# Patient Record
Sex: FEMALE | Race: BLACK OR AFRICAN AMERICAN | NOT HISPANIC OR LATINO | Employment: OTHER | URBAN - METROPOLITAN AREA
[De-identification: names, ages, dates, MRNs, and addresses within clinical notes are randomized per-mention and may not be internally consistent; named-entity substitution may affect disease eponyms.]

---

## 2017-09-12 ENCOUNTER — OFFICE VISIT (OUTPATIENT)
Dept: INTERNAL MEDICINE | Facility: CLINIC | Age: 57
End: 2017-09-12
Payer: MEDICARE

## 2017-09-12 VITALS
SYSTOLIC BLOOD PRESSURE: 148 MMHG | WEIGHT: 172.19 LBS | RESPIRATION RATE: 20 BRPM | HEIGHT: 63 IN | DIASTOLIC BLOOD PRESSURE: 76 MMHG | HEART RATE: 84 BPM | BODY MASS INDEX: 30.51 KG/M2 | OXYGEN SATURATION: 97 %

## 2017-09-12 DIAGNOSIS — Z12.11 ENCOUNTER FOR COLORECTAL CANCER SCREENING: ICD-10-CM

## 2017-09-12 DIAGNOSIS — R94.128 ABNORMAL HEARING TEST: ICD-10-CM

## 2017-09-12 DIAGNOSIS — F33.1 MDD (MAJOR DEPRESSIVE DISORDER), RECURRENT EPISODE, MODERATE: ICD-10-CM

## 2017-09-12 DIAGNOSIS — M54.2 CHRONIC NECK AND BACK PAIN: ICD-10-CM

## 2017-09-12 DIAGNOSIS — Z12.12 ENCOUNTER FOR COLORECTAL CANCER SCREENING: ICD-10-CM

## 2017-09-12 DIAGNOSIS — E66.9 OBESITY, CLASS I, BMI 30-34.9: ICD-10-CM

## 2017-09-12 DIAGNOSIS — Z11.59 NEED FOR HEPATITIS C SCREENING TEST: ICD-10-CM

## 2017-09-12 DIAGNOSIS — M54.9 CHRONIC NECK AND BACK PAIN: ICD-10-CM

## 2017-09-12 DIAGNOSIS — Z00.00 ENCOUNTER FOR PREVENTIVE HEALTH EXAMINATION: Primary | ICD-10-CM

## 2017-09-12 DIAGNOSIS — F19.21 HISTORY OF DRUG DEPENDENCE/ABUSE: ICD-10-CM

## 2017-09-12 DIAGNOSIS — I10 ESSENTIAL HYPERTENSION: ICD-10-CM

## 2017-09-12 DIAGNOSIS — Z01.118 ABNORMAL HEARING TEST: ICD-10-CM

## 2017-09-12 DIAGNOSIS — G89.29 CHRONIC NECK AND BACK PAIN: ICD-10-CM

## 2017-09-12 PROBLEM — F32.1 MDD (MAJOR DEPRESSIVE DISORDER), SINGLE EPISODE, MODERATE: Status: ACTIVE | Noted: 2017-09-12

## 2017-09-12 PROCEDURE — G0439 PPPS, SUBSEQ VISIT: HCPCS | Mod: S$GLB,,, | Performed by: NURSE PRACTITIONER

## 2017-09-12 PROCEDURE — 99499 UNLISTED E&M SERVICE: CPT | Mod: S$GLB,,, | Performed by: NURSE PRACTITIONER

## 2017-09-12 PROCEDURE — 99999 PR PBB SHADOW E&M-EST. PATIENT-LVL V: CPT | Mod: PBBFAC,,, | Performed by: NURSE PRACTITIONER

## 2017-09-12 RX ORDER — IBUPROFEN 800 MG/1
800 TABLET ORAL 3 TIMES DAILY PRN
COMMUNITY
Start: 2017-09-11

## 2017-09-12 RX ORDER — AMOXICILLIN 500 MG/1
1000 CAPSULE ORAL EVERY 8 HOURS
COMMUNITY
Start: 2017-09-11 | End: 2017-12-13

## 2017-09-12 RX ORDER — SERTRALINE HYDROCHLORIDE 50 MG/1
50 TABLET, FILM COATED ORAL DAILY
COMMUNITY
Start: 2017-09-05

## 2017-09-12 RX ORDER — VALSARTAN AND HYDROCHLOROTHIAZIDE 320; 12.5 MG/1; MG/1
1 TABLET, FILM COATED ORAL DAILY
COMMUNITY

## 2017-09-12 RX ORDER — CYCLOBENZAPRINE HCL 10 MG
10 TABLET ORAL 3 TIMES DAILY PRN
COMMUNITY
Start: 2017-07-12 | End: 2018-05-09

## 2017-09-12 RX ORDER — BUDESONIDE AND FORMOTEROL FUMARATE DIHYDRATE 160; 4.5 UG/1; UG/1
2 AEROSOL RESPIRATORY (INHALATION) EVERY 12 HOURS
COMMUNITY

## 2017-09-12 RX ORDER — MELOXICAM 15 MG/1
15 TABLET ORAL 2 TIMES DAILY PRN
COMMUNITY
Start: 2017-07-04

## 2017-09-12 RX ORDER — TRIAMCINOLONE ACETONIDE 5 MG/G
CREAM TOPICAL 2 TIMES DAILY PRN
COMMUNITY
Start: 2017-07-12

## 2017-09-12 RX ORDER — BUDESONIDE AND FORMOTEROL FUMARATE DIHYDRATE 160; 4.5 UG/1; UG/1
AEROSOL RESPIRATORY (INHALATION)
COMMUNITY
Start: 2015-07-14 | End: 2017-09-12 | Stop reason: SDUPTHER

## 2017-09-12 RX ORDER — ALBUTEROL SULFATE 1.25 MG/3ML
1.25 SOLUTION RESPIRATORY (INHALATION) EVERY 6 HOURS PRN
COMMUNITY
End: 2017-09-12 | Stop reason: SDUPTHER

## 2017-09-12 RX ORDER — ALBUTEROL SULFATE 90 UG/1
1-2 AEROSOL, METERED RESPIRATORY (INHALATION) EVERY 6 HOURS PRN
COMMUNITY
Start: 2015-04-10

## 2017-09-12 NOTE — PATIENT INSTRUCTIONS
Please call [phone] to schedule an appointment with our [facility] doctor (Dr. Vásquez) to evaluate your hearing.          Counseling and Referral of Other Preventative  (Italic type indicates deductible and co-insurance are waived)    Patient Name: Alecia Martinez  Today's Date: 9/12/2017      SERVICE LIMITATIONS RECOMMENDATION    Vaccines    · Pneumococcal (once after 65)    · Influenza (annually)    · Hepatitis B (if medium/high risk)    · Prevnar 13      Hepatitis B medium/high risk factors:       - End-stage renal disease       - Hemophiliacs who received Factor VII or         IX concentrates       - Clients of institutions for the mentally             retarded       - Persons who live in the same house as          a HepB carrier       - Homosexual men       - Illicit injectable drug abusers     Pneumococcal: N/A  She stated she would obtain at retail pharmacy.     Influenza: N/A  She stated she would obtain at retail pharmacy.     Hepatitis B: N/A     Prevnar 13: N/A Not indicated until after 64 yo    Mammogram (biennial age 50-74)  Annually (age 40 or over)  Scheduled, see appointments.  She stated she has a mammogram scheduled next week at [facility].    Pap (up to age 70 and after 70 if unknown history or abnormal study last 10 years)    Last done 8/2016, recommend to repeat every 3  years or as recommended by Gynecology.     The USPSTF recommends screening for cervical cancer in women age 21 to 65 years with cytology (Pap smear) every 3 years.     Colorectal cancer screening (to age 75)    · Fecal occult blood test (annual)  · Flexible sigmoidoscopy (5y)  · Screening colonoscopy (10y)  · Barium enema   Scheduled, see appointments.  Ordered.      Diabetes self-management training (no USPSTF recommendations)  Requires referral by treating physician for patient with diabetes or renal disease. 10 hours of initial DSMT sessions of no less than 30 minutes each in a continuous 12-month  period. 2 hours of follow-up DSMT in subsequent years.  N/A    Bone mass measurements (age 65 & older, biennial)  Requires diagnosis related to osteoporosis or estrogen deficiency. Biennial benefit unless patient has history of long-term glucocorticoid  N/A    Glaucoma screening (no USPSTF recommendation)  Diabetes mellitus, family history   , age 50 or over    American, age 65 or over  Scheduled, see appointments    Medical nutrition therapy for diabetes or renal disease (no recommended schedule)  Requires referral by treating physician for patient with diabetes or renal disease or kidney transplant within the past 3 years.  Can be provided in same year as diabetes self-management training (DSMT), and CMS recommends medical nutrition therapy take place after DSMT. Up to 3 hours for initial year and 2 hours in subsequent years.  N/A    Cardiovascular screening blood tests (every 5 years)  · Fasting lipid panel  Order as a panel if possible  Last done 10/2016, recommend to repeat every 1  years    Diabetes screening tests (at least every 3 years, Medicare covers annually or at 6-month intervals for prediabetic patients)  · Fasting blood sugar (FBS) or glucose tolerance test (GTT)  Patient must be diagnosed with one of the following:       - Hypertension       - Dyslipidemia       - Obesity (BMI 30kg/m2)       - Previous elevated impaired FBS or GTT       ... or any two of the following:       - Overweight (BMI 25 but <30)       - Family history of diabetes       - Age 65 or older       - History of gestational diabetes or birth of baby weighing more than 9 pounds  Last done 2016, recommend to repeat every 1  years    HIV screening (annually for increased risk patients)  · HIV-1 and HIV-2 by EIA, or GISEL, rapid antibody test or oral mucosa transudate  Patients must be at increased risk for HIV infection per USPSTF guidelines or pregnant. Tests covered annually for patient at increased risk or  as requested by the patient. Pregnant patients may receive up to 3 tests during pregnancy.  Risks discussed, screening is not recommended    Smoking cessation counseling (up to 8 sessions per year)  Patients must be asymptomatic of tobacco-related conditions to receive as a preventative service.  Non-smoker.  Stated she stopped smoking 2.5 months ago.    Subsequent annual wellness visit  At least 12 months since last AWV  Return in one year     The following information is provided to all patients.  This information is to help you find resources for any of the problems found today that may be affecting your health:                Living healthy guide: www.[facility].[place].[facility]      Understanding Diabetes: www.diabetes.org      Eating healthy: www.cdc.gov/healthyweight      CDC home safety checklist: www.cdc.gov/steadi/patient.html      Agency on Aging: www.goea.[place].[facility]      Alcoholics anonymous (AA): www.aa.org      Physical Activity: www.cari.nih.gov/ov5ovqi      Tobacco use: www.quitwithusla.org

## 2017-09-12 NOTE — PROGRESS NOTES
"Alecia Martinez presented for a  Medicare AWV and comprehensive Health Risk Assessment today. The following components were reviewed and updated:    · Medical history  · Family History  · Social history  · Allergies and Current Medications  · Health Risk Assessment  · Health Maintenance  · Care Team     ** See Completed Assessments for Annual Wellness Visit within the encounter summary.**       The following assessments were completed:  · Living Situation  · CAGE (not performed, not clinically indicated)  · Depression Screening  · Timed Get Up and Go  · Whisper Test  · Cognitive Function Screening (declined)  · Nutrition Screening  · ADL Screening  · PAQ Screening    Review of Systems   Constitutional: Negative for malaise/fatigue.   Eyes: Negative for blurred vision.   Respiratory: Negative for shortness of breath.    Cardiovascular: Negative for chest pain and leg swelling.   Gastrointestinal: Negative for abdominal pain, nausea and vomiting.   Neurological: Negative for dizziness, weakness and headaches.       Vitals:    09/12/17 1325   BP: (!) 148/76   BP Location: Left arm   Patient Position: Sitting   BP Method: Medium (Manual)   Pulse: 84   Resp: 20   SpO2: 97%   Weight: 78.1 kg (172 lb 2.9 oz)   Height: 5' 3" (1.6 m)     Body mass index is 30.5 kg/m².  Physical Exam   Constitutional: She is oriented to person, place, and time. She appears well-developed and well-nourished.   HENT:   Head: Normocephalic.   Right Ear: External ear normal.   Left Ear: External ear normal.   Mouth/Throat: Oropharynx is clear and moist.   Eyes: Pupils are equal, round, and reactive to light.   Cardiovascular: Normal rate, regular rhythm and intact distal pulses.    Murmur heard.  Pulmonary/Chest: Effort normal and breath sounds normal.   Abdominal: Soft. Bowel sounds are normal.   Musculoskeletal: Normal range of motion. She exhibits no edema.   Neurological: She is alert and oriented to person, place, and time.   Skin: Skin is " warm and dry.   Vitals reviewed.        Diagnoses and health risks identified today and associated recommendations/orders:    1. MDD (major depressive disorder), recurrent episode, moderate  Chronic.  Stable and controlled.  Treated with sertraline.  PHQ-2 score today = 2 .  Encouraged to continue treatment plan.  Denies suicidal/homicidal ideation.  Evaluated and monitored by Psychiatry.  Last seen 9/7/2017 and has a f/u in 1 month.    2. History of drug dependence/abuse  Per Dr. Julian Obrien medical records brought in by patient (paperwork copied and sent for scanning to [employer]), patient has a history of cocaine and marijuana abuse.  Patient stated she has not used in 2.5 months and went through counseling 2.5 months ago.  Counseled and monitored by Psychiatry.        3. Encounter for preventive health examination  Annual Health Risk Assessment (HRA) visit today.  Counseling and referral of health maintenance and preventative health measures performed.  Patient given annual wellness paperwork to take home.  Encouraged to return in 1 year for subsequent HRA visit.  - Counseled on PPSV23 and influenza immunizations.  Encouraged to obtain at retail pharmacy of choice.  - Colonoscopy, hepatitis c screening test ordered.  - All other health maintenance measures are up to date.    4. Abnormal hearing test  - Ambulatory consult to ENT  - She was given contact information to schedule per her request.    5. Encounter for colorectal cancer screening  - Case request GI: COLONOSCOPY    6. Need for hepatitis C screening test  - Hepatitis C antibody; Future  - Stated she would obtain at next blood draw.    7. Essential hypertension  Chronic.  Uncontrolled.  Treated with valsartan-HCTZ.  Encouraged to increase exercise as tolerated to at least 2 hours and 30 minutes of moderate-intensity aerobic activity per week and  2 days per week of muscle-strengthening activities.  Also encouraged to improve diet to heart healthy, low  sodium diet.  Education provided.  Recommended follow up in 2-4 weeks with PCP or nurse visit for repeat blood pressure check.  Monitored by PCP.      8. Chronic neck and back pain  Stable.  Treated with ibuprofen and meloxicam as needed.  Cautioned on NSAID use.  Monitored by PCP.      - Ambulatory Referral to Pain Clinic    9. Obesity, Class I, BMI 30-34.9  Chronic.  Weight has been stable.  Encouraged to increase exercise as tolerated and improve diet to heart healthy diet.   Monitored by PCP.     Provided Alecia with a 5-10 year written screening schedule and personal prevention plan. Recommendations were developed using the USPSTF age appropriate recommendations. Education, counseling, and referrals were provided as needed. After Visit Summary printed and given to patient which includes a list of additional screenings\tests needed.    Return in about 1 year (around 9/12/2018) for your next Health Risk Assessment visit.    Nuris Staley NP

## 2017-12-13 ENCOUNTER — INITIAL CONSULT (OUTPATIENT)
Dept: OPTOMETRY | Facility: CLINIC | Age: 57
End: 2017-12-13
Payer: MEDICARE

## 2017-12-13 DIAGNOSIS — H52.13 MYOPIA WITH ASTIGMATISM AND PRESBYOPIA, BILATERAL: ICD-10-CM

## 2017-12-13 DIAGNOSIS — H40.023 OPEN ANGLE WITH BORDERLINE FINDINGS AND HIGH GLAUCOMA RISK IN BOTH EYES: Primary | ICD-10-CM

## 2017-12-13 DIAGNOSIS — I10 ESSENTIAL HYPERTENSION: ICD-10-CM

## 2017-12-13 DIAGNOSIS — H52.203 MYOPIA WITH ASTIGMATISM AND PRESBYOPIA, BILATERAL: ICD-10-CM

## 2017-12-13 DIAGNOSIS — H52.4 MYOPIA WITH ASTIGMATISM AND PRESBYOPIA, BILATERAL: ICD-10-CM

## 2017-12-13 PROCEDURE — 99999 PR PBB SHADOW E&M-EST. PATIENT-LVL II: CPT | Mod: PBBFAC,,, | Performed by: OPTOMETRIST

## 2017-12-13 PROCEDURE — 92004 COMPRE OPH EXAM NEW PT 1/>: CPT | Mod: S$GLB,,, | Performed by: OPTOMETRIST

## 2017-12-13 PROCEDURE — 99499 UNLISTED E&M SERVICE: CPT | Mod: S$GLB,,, | Performed by: OPTOMETRIST

## 2017-12-13 RX ORDER — PNEUMOCOCCAL VACCINE POLYVALENT 25; 25; 25; 25; 25; 25; 25; 25; 25; 25; 25; 25; 25; 25; 25; 25; 25; 25; 25; 25; 25; 25; 25 UG/.5ML; UG/.5ML; UG/.5ML; UG/.5ML; UG/.5ML; UG/.5ML; UG/.5ML; UG/.5ML; UG/.5ML; UG/.5ML; UG/.5ML; UG/.5ML; UG/.5ML; UG/.5ML; UG/.5ML; UG/.5ML; UG/.5ML; UG/.5ML; UG/.5ML; UG/.5ML; UG/.5ML; UG/.5ML; UG/.5ML
INJECTION, SOLUTION INTRAMUSCULAR; SUBCUTANEOUS
COMMUNITY
Start: 2017-09-12 | End: 2017-12-13 | Stop reason: ALTCHOICE

## 2017-12-13 RX ORDER — VALSARTAN AND HYDROCHLOROTHIAZIDE 320; 25 MG/1; MG/1
TABLET, FILM COATED ORAL
COMMUNITY
Start: 2017-10-27

## 2017-12-13 RX ORDER — AMOXICILLIN 875 MG/1
TABLET, FILM COATED ORAL
COMMUNITY
Start: 2017-10-03 | End: 2017-12-13

## 2017-12-13 RX ORDER — BENZONATATE 100 MG/1
CAPSULE ORAL
COMMUNITY
Start: 2017-10-04 | End: 2017-12-13

## 2017-12-13 RX ORDER — TRAZODONE HYDROCHLORIDE 50 MG/1
TABLET ORAL
COMMUNITY
Start: 2017-11-20 | End: 2018-01-02 | Stop reason: DRUGHIGH

## 2017-12-13 NOTE — PROGRESS NOTES
HPI     Last eye exam was approximately 3 years ago.  Patient states overall vision is blurry. Was prescribed glasses at last   exam but didn't fill the rx. Wanted to get a glasses rx today. Sees   startbursts around lights at night.   Patient denies diplopia, headaches, flashes/floaters, and pain.      Last edited by Reina Crane on 12/13/2017  2:34 PM. (History)            Assessment /Plan     For exam results, see Encounter Report.    Open angle with borderline findings and high glaucoma risk in both eyes  -     Bethea Visual Field - OU - Extended - Both Eyes; Future    Essential hypertension    Myopia with astigmatism and presbyopia, bilateral            1.  Due to increased c/d ratio, IOPs, and family history.  RTC 1 month for glaucoma work-up.  2.  No retinopathy--monitor yearly.  BP control.  3.  No rx given today.  Will recheck glasses rx at next visit and possible contact lens fit.  Topography and auto done today.

## 2018-01-02 ENCOUNTER — CLINICAL SUPPORT (OUTPATIENT)
Dept: OPHTHALMOLOGY | Facility: CLINIC | Age: 58
End: 2018-01-02
Payer: MEDICARE

## 2018-01-02 ENCOUNTER — OFFICE VISIT (OUTPATIENT)
Dept: OPTOMETRY | Facility: CLINIC | Age: 58
End: 2018-01-02
Payer: MEDICARE

## 2018-01-02 DIAGNOSIS — H40.023 OPEN ANGLE WITH BORDERLINE FINDINGS AND HIGH GLAUCOMA RISK IN BOTH EYES: Primary | ICD-10-CM

## 2018-01-02 DIAGNOSIS — H52.203 MYOPIA WITH ASTIGMATISM AND PRESBYOPIA, BILATERAL: ICD-10-CM

## 2018-01-02 DIAGNOSIS — H52.4 MYOPIA WITH ASTIGMATISM AND PRESBYOPIA, BILATERAL: ICD-10-CM

## 2018-01-02 DIAGNOSIS — H52.4 MYOPIA WITH ASTIGMATISM AND PRESBYOPIA, BILATERAL: Primary | ICD-10-CM

## 2018-01-02 DIAGNOSIS — H52.13 MYOPIA WITH ASTIGMATISM AND PRESBYOPIA, BILATERAL: Primary | ICD-10-CM

## 2018-01-02 DIAGNOSIS — H52.13 MYOPIA WITH ASTIGMATISM AND PRESBYOPIA, BILATERAL: ICD-10-CM

## 2018-01-02 DIAGNOSIS — H52.203 MYOPIA WITH ASTIGMATISM AND PRESBYOPIA, BILATERAL: Primary | ICD-10-CM

## 2018-01-02 DIAGNOSIS — H40.023 OPEN ANGLE WITH BORDERLINE FINDINGS AND HIGH GLAUCOMA RISK IN BOTH EYES: ICD-10-CM

## 2018-01-02 PROCEDURE — 76514 ECHO EXAM OF EYE THICKNESS: CPT | Mod: PBBFAC | Performed by: OPTOMETRIST

## 2018-01-02 PROCEDURE — 92083 EXTENDED VISUAL FIELD XM: CPT | Mod: PBBFAC

## 2018-01-02 PROCEDURE — 92083 EXTENDED VISUAL FIELD XM: CPT | Mod: S$GLB,,, | Performed by: OPTOMETRIST

## 2018-01-02 PROCEDURE — 92310 CONTACT LENS FITTING OU: CPT | Mod: S$GLB,,, | Performed by: OPTOMETRIST

## 2018-01-02 PROCEDURE — 76514 ECHO EXAM OF EYE THICKNESS: CPT | Mod: S$GLB,,, | Performed by: OPTOMETRIST

## 2018-01-02 PROCEDURE — 92133 CPTRZD OPH DX IMG PST SGM ON: CPT | Mod: PBBFAC | Performed by: OPTOMETRIST

## 2018-01-02 PROCEDURE — 92012 INTRM OPH EXAM EST PATIENT: CPT | Mod: S$GLB,,, | Performed by: OPTOMETRIST

## 2018-01-02 PROCEDURE — 99499 UNLISTED E&M SERVICE: CPT | Mod: S$GLB,,, | Performed by: OPTOMETRIST

## 2018-01-02 PROCEDURE — 99999 PR PBB SHADOW E&M-EST. PATIENT-LVL II: CPT | Mod: PBBFAC,,, | Performed by: OPTOMETRIST

## 2018-01-02 RX ORDER — TRAZODONE HYDROCHLORIDE 100 MG/1
100 TABLET ORAL NIGHTLY PRN
Refills: 1 | COMMUNITY
Start: 2017-12-13

## 2018-01-03 NOTE — PROGRESS NOTES
HPI     Last eye exam was 12/13/17 with Dr. Garsia.  Patient states vision is still blurry and hard to focus since last exam.   Here for glaucoma eval and possible SCL fit.     Last edited by Reina Crane on 1/2/2018  2:35 PM. (History)            Assessment /Plan     For exam results, see Encounter Report.    Open angle with borderline findings and high glaucoma risk in both eyes  -     OCT - Optic Nerve    Myopia with astigmatism and presbyopia, bilateral        1. No signs of glaucoma at this time.  Monitor IOPs closely. Strong family history.  HVF: 1/2/17  OCT: 1/2/17  DFE: 12/13/17  Photos:  Gonio:  Pachy: 581 OD, 550 OS  Initial IOPs: 26 OD, 26 OS  MHx: HTN  FHx: MGM, Mother  2.  Bifocal rx given.  Ordering contact lens trials--dispense at [facility].

## 2018-02-06 ENCOUNTER — TELEPHONE (OUTPATIENT)
Dept: OPTOMETRY | Facility: CLINIC | Age: 58
End: 2018-02-06

## 2018-02-06 NOTE — TELEPHONE ENCOUNTER
----- Message from Rebeac FUENTES Stinger sent at 2/5/2018  1:13 PM CST -----  Hello,    We have just received trial lenses for this pt.  Please call pt to schedule a [email or web address].    Thanks,  Caprice

## 2018-02-07 ENCOUNTER — TELEPHONE (OUTPATIENT)
Dept: OPTOMETRY | Facility: CLINIC | Age: 58
End: 2018-02-07

## 2018-02-15 ENCOUNTER — TELEPHONE (OUTPATIENT)
Dept: OPTOMETRY | Facility: CLINIC | Age: 58
End: 2018-02-15

## 2018-02-20 ENCOUNTER — TELEPHONE (OUTPATIENT)
Dept: OPTOMETRY | Facility: CLINIC | Age: 58
End: 2018-02-20

## 2018-02-20 NOTE — TELEPHONE ENCOUNTER
Called to schedule ctl dispense, number on file appears to be a fax number, unable to reach pt, unable to leave vm

## 2018-04-10 ENCOUNTER — TELEPHONE (OUTPATIENT)
Dept: OPTOMETRY | Facility: CLINIC | Age: 58
End: 2018-04-10

## 2018-04-10 NOTE — TELEPHONE ENCOUNTER
Called to reschedule ctl appt. Trials were returned due to pt not following for follow up and 5 unreturned calls

## 2018-05-09 ENCOUNTER — OFFICE VISIT (OUTPATIENT)
Dept: OPTOMETRY | Facility: CLINIC | Age: 58
End: 2018-05-09
Payer: MEDICARE

## 2018-05-09 DIAGNOSIS — H52.203 MYOPIA WITH ASTIGMATISM AND PRESBYOPIA, BILATERAL: Primary | ICD-10-CM

## 2018-05-09 DIAGNOSIS — H52.13 MYOPIA WITH ASTIGMATISM AND PRESBYOPIA, BILATERAL: Primary | ICD-10-CM

## 2018-05-09 DIAGNOSIS — H52.4 MYOPIA WITH ASTIGMATISM AND PRESBYOPIA, BILATERAL: Primary | ICD-10-CM

## 2018-05-09 PROCEDURE — 92499 UNLISTED OPH SVC/PROCEDURE: CPT | Mod: ,,, | Performed by: OPTOMETRIST

## 2018-05-09 RX ORDER — GABAPENTIN 300 MG/1
CAPSULE ORAL
COMMUNITY
Start: 2018-04-27

## 2018-05-09 NOTE — PROGRESS NOTES
HPI     Patient's last dilated exam was: 12/13/2017 W/ Dr. Garsia    Pt here for ctl dispense. Denies changes in vision. Mild blurred vision,   occasional, when she does not wear her glasses. Patient denies flashes,   floaters, pain and double vision. Not using any gtts.     Last edited by Aria Fischer, PCT on 5/9/2018  3:38 PM.   (History)            Assessment /Plan     For exam results, see Encounter Report.    Myopia with astigmatism and presbyopia, bilateral          1.  Contact lens trials dispensed.  RTC 1 week for cl and iop check.

## 2018-05-16 ENCOUNTER — OFFICE VISIT (OUTPATIENT)
Dept: OPTOMETRY | Facility: CLINIC | Age: 58
End: 2018-05-16
Payer: MEDICARE

## 2018-05-16 DIAGNOSIS — H40.023 OPEN ANGLE WITH BORDERLINE FINDINGS AND HIGH GLAUCOMA RISK IN BOTH EYES: ICD-10-CM

## 2018-05-16 DIAGNOSIS — H52.13 MYOPIA WITH ASTIGMATISM AND PRESBYOPIA, BILATERAL: Primary | ICD-10-CM

## 2018-05-16 DIAGNOSIS — H52.203 MYOPIA WITH ASTIGMATISM AND PRESBYOPIA, BILATERAL: Primary | ICD-10-CM

## 2018-05-16 DIAGNOSIS — H52.4 MYOPIA WITH ASTIGMATISM AND PRESBYOPIA, BILATERAL: Primary | ICD-10-CM

## 2018-05-16 PROCEDURE — 92499 UNLISTED OPH SVC/PROCEDURE: CPT | Mod: ,,, | Performed by: OPTOMETRIST

## 2018-05-16 RX ORDER — AMOXICILLIN 500 MG/1
CAPSULE ORAL
Refills: 0 | COMMUNITY
Start: 2018-05-10

## 2018-05-16 NOTE — PROGRESS NOTES
HPI     Patient's last dilated exam was: 12/13/2017 W/ Dr. Garsia    Pt here for iop check and ctl check. No changes in vision since his last   exam. Wearing otc readers +2.00 over her ctl, happy with vision. Takes ctl   out nightly. No other ocular complaints     Last edited by Aria Fischer, PCT on 5/16/2018  4:04 PM.   (History)            Assessment /Plan     For exam results, see Encounter Report.    Myopia with astigmatism and presbyopia, bilateral    Open angle with borderline findings and high glaucoma risk in both eyes          1.  Contact lens rx given.  2. IOPs stable OU.  Past testing normal.  Continue to monitor closely.    HVF: 1/2/18  OCT: 1/2/18  DFE: 12/13/17  Photos:  Gonio:  Pachy: 581 OD, 550 OS  Initial IOPs: 26 OD, 26 OS  MHx: HTN  FHx: MGM, Mother